# Patient Record
Sex: FEMALE | Race: WHITE | NOT HISPANIC OR LATINO | Employment: FULL TIME | URBAN - NONMETROPOLITAN AREA
[De-identification: names, ages, dates, MRNs, and addresses within clinical notes are randomized per-mention and may not be internally consistent; named-entity substitution may affect disease eponyms.]

---

## 2018-10-22 PROBLEM — O34.211 MATERNAL CARE DUE TO LOW TRANSVERSE UTERINE SCAR FROM PREVIOUS CESAREAN DELIVERY: Status: ACTIVE | Noted: 2018-10-22

## 2018-10-24 PROBLEM — O34.211 MATERNAL CARE DUE TO LOW TRANSVERSE UTERINE SCAR FROM PREVIOUS CESAREAN DELIVERY: Status: RESOLVED | Noted: 2018-10-22 | Resolved: 2018-10-24

## 2021-05-10 DIAGNOSIS — R10.13 EPIGASTRIC PAIN: Primary | ICD-10-CM

## 2021-05-14 ENCOUNTER — HOSPITAL ENCOUNTER (OUTPATIENT)
Dept: RADIOLOGY | Facility: HOSPITAL | Age: 41
Discharge: HOME | End: 2021-05-14
Attending: FAMILY MEDICINE
Payer: COMMERCIAL

## 2021-05-14 DIAGNOSIS — R10.13 EPIGASTRIC PAIN: ICD-10-CM

## 2021-05-14 PROCEDURE — 74240 X-RAY XM UPR GI TRC 1CNTRST: CPT | Mod: TC

## 2021-05-14 PROCEDURE — 25500020 PHARM REV CODE 255: Performed by: FAMILY MEDICINE

## 2021-05-14 PROCEDURE — A9698 NON-RAD CONTRAST MATERIALNOC: HCPCS | Performed by: FAMILY MEDICINE

## 2021-05-14 RX ADMIN — BARIUM SULFATE 135 ML: 980 POWDER, FOR SUSPENSION ORAL at 08:05

## 2021-05-14 RX ADMIN — BARIUM SULFATE 176 G: 960 POWDER, FOR SUSPENSION ORAL at 08:05

## 2024-07-10 ENCOUNTER — OFFICE VISIT (OUTPATIENT)
Dept: PRIMARY CARE CLINIC | Facility: CLINIC | Age: 44
End: 2024-07-10
Payer: COMMERCIAL

## 2024-07-10 VITALS
HEIGHT: 69 IN | WEIGHT: 189 LBS | RESPIRATION RATE: 16 BRPM | OXYGEN SATURATION: 98 % | SYSTOLIC BLOOD PRESSURE: 123 MMHG | HEART RATE: 83 BPM | BODY MASS INDEX: 27.99 KG/M2 | DIASTOLIC BLOOD PRESSURE: 69 MMHG

## 2024-07-10 DIAGNOSIS — Z86.69 HX OF MIGRAINES: ICD-10-CM

## 2024-07-10 DIAGNOSIS — Z51.89 ENCOUNTER FOR BLOOD TRANSFUSION: ICD-10-CM

## 2024-07-10 DIAGNOSIS — Z76.89 ENCOUNTER TO ESTABLISH CARE: Primary | ICD-10-CM

## 2024-07-10 DIAGNOSIS — Z12.31 BREAST CANCER SCREENING BY MAMMOGRAM: ICD-10-CM

## 2024-07-10 PROCEDURE — 99203 OFFICE O/P NEW LOW 30 MIN: CPT | Mod: S$GLB,,, | Performed by: NURSE PRACTITIONER

## 2024-07-10 PROCEDURE — 3008F BODY MASS INDEX DOCD: CPT | Mod: CPTII,S$GLB,, | Performed by: NURSE PRACTITIONER

## 2024-07-10 PROCEDURE — 3078F DIAST BP <80 MM HG: CPT | Mod: CPTII,S$GLB,, | Performed by: NURSE PRACTITIONER

## 2024-07-10 PROCEDURE — 1159F MED LIST DOCD IN RCRD: CPT | Mod: CPTII,S$GLB,, | Performed by: NURSE PRACTITIONER

## 2024-07-10 PROCEDURE — 3074F SYST BP LT 130 MM HG: CPT | Mod: CPTII,S$GLB,, | Performed by: NURSE PRACTITIONER

## 2024-07-10 PROCEDURE — 1160F RVW MEDS BY RX/DR IN RCRD: CPT | Mod: CPTII,S$GLB,, | Performed by: NURSE PRACTITIONER

## 2024-07-10 PROCEDURE — 99999 PR PBB SHADOW E&M-NEW PATIENT-LVL III: CPT | Mod: PBBFAC,,, | Performed by: NURSE PRACTITIONER

## 2024-07-10 NOTE — PROGRESS NOTES
"[facility] Note    HPI:  Delfina Carrero is a 43 y.o. female who presents today for [facility] Care (Est care)      Review of Systems   Constitutional: Negative.    HENT: Negative.     Eyes: Negative.    Respiratory: Negative.     Cardiovascular: Negative.    Gastrointestinal: Negative.    Genitourinary: Negative.    Musculoskeletal:  Positive for back pain (low).   Skin: Negative.    Neurological: Negative.    Endo/Heme/Allergies: Negative.    Psychiatric/Behavioral: Negative.        A review of systems was performed and was negative except as noted above.    I personally reviewed allergies, past medical, surgical, social and family history and updated as appropriate.    Medications:  No current outpatient medications on file.     Health Maintenance:  There is no immunization history for the selected administration types on file for this patient.   Health Maintenance   Topic Date Due    Mammogram  Never done    TETANUS VACCINE  06/06/2032    Hepatitis C Screening  Completed    Lipid Panel  Completed     Health Maintenance Topics with due status: Not Due       Topic Last Completion Date    Influenza Vaccine 12/08/2010    TETANUS VACCINE 06/06/2022    Cervical Cancer Screening 12/06/2023     Health Maintenance Due   Topic Date Due    HIV Screening  Never done    Mammogram  Never done    Hemoglobin A1c (Diabetic Prevention Screening)  Never done    COVID-19 Vaccine (1 - 2023-24 season) Never done       PHYSICAL EXAM:  Vitals:    07/10/24 1038   BP: 123/69   BP Location: Left arm   Patient Position: Sitting   BP Method: Large (Automatic)   Pulse: 83   Resp: 16   SpO2: 98%   Weight: 85.7 kg (189 lb)   Height: 5' 9" (1.753 m)     Body mass index is 27.91 kg/m².  Physical Exam  Constitutional:       Appearance: Normal appearance. She is normal weight.   HENT:      Head: Normocephalic.      Right Ear: Tympanic membrane, ear canal and external ear normal.      Left Ear: Tympanic membrane, ear canal and " external ear normal.      Nose: Nose normal.      Mouth/Throat:      Mouth: Mucous membranes are moist.   Cardiovascular:      Rate and Rhythm: Normal rate and regular rhythm.      Pulses: Normal pulses.      Heart sounds: Normal heart sounds.   Pulmonary:      Effort: Pulmonary effort is normal.      Breath sounds: Normal breath sounds.   Musculoskeletal:         General: Normal range of motion.      Cervical back: Normal range of motion.   Skin:     General: Skin is warm and dry.   Neurological:      General: No focal deficit present.      Mental Status: She is alert and oriented to person, place, and time.          ASSESSMENT/PLAN:  1. Encounter to establish care    2. Breast cancer screening by mammogram  -     Mammo Digital Screening Bilat w/ Bong; Future; Expected date: 07/10/2024    3. Hx of migraines  Overview:  after epidural      4. Encounter for blood transfusion        Other than changes above, continue current medications and maintain follow up with specialists.      No follow-ups on file.   No results found for this or any previous visit (from the past 2016 hour(s)).      Elizabeth Jacmkan, TRISTIAN  [facility]

## 2024-07-12 ENCOUNTER — HOSPITAL ENCOUNTER (OUTPATIENT)
Dept: RADIOLOGY | Facility: HOSPITAL | Age: 44
Discharge: HOME | End: 2024-07-12
Attending: NURSE PRACTITIONER
Payer: COMMERCIAL

## 2024-07-12 VITALS — BODY MASS INDEX: 27.99 KG/M2 | HEIGHT: 69 IN | WEIGHT: 189 LBS

## 2024-07-12 DIAGNOSIS — Z12.31 BREAST CANCER SCREENING BY MAMMOGRAM: ICD-10-CM

## 2024-07-12 PROCEDURE — 77067 SCR MAMMO BI INCL CAD: CPT | Mod: TC

## 2024-07-12 PROCEDURE — 77063 BREAST TOMOSYNTHESIS BI: CPT | Mod: TC

## 2024-12-24 ENCOUNTER — HOSPITAL ENCOUNTER (EMERGENCY)
Facility: HOSPITAL | Age: 44
Discharge: HOME | End: 2024-12-24
Attending: EMERGENCY MEDICINE
Payer: COMMERCIAL

## 2024-12-24 VITALS
BODY MASS INDEX: 29.03 KG/M2 | HEIGHT: 69 IN | TEMPERATURE: 100 F | DIASTOLIC BLOOD PRESSURE: 75 MMHG | SYSTOLIC BLOOD PRESSURE: 121 MMHG | RESPIRATION RATE: 20 BRPM | WEIGHT: 196 LBS | OXYGEN SATURATION: 96 % | HEART RATE: 108 BPM

## 2024-12-24 DIAGNOSIS — B34.9 VIRAL SYNDROME: Primary | ICD-10-CM

## 2024-12-24 DIAGNOSIS — M79.606 LEG PAIN: ICD-10-CM

## 2024-12-24 LAB
ADENOVIRUS: NOT DETECTED
ALBUMIN SERPL BCP-MCNC: 3.5 G/DL (ref 3.5–5.2)
ALP SERPL-CCNC: 71 U/L (ref 55–135)
ALT SERPL W/O P-5'-P-CCNC: 17 U/L (ref 10–44)
ANION GAP SERPL CALC-SCNC: 5 MMOL/L (ref 3–11)
AST SERPL-CCNC: 12 U/L (ref 10–40)
BASOPHILS # BLD AUTO: 0.01 K/UL (ref 0–0.2)
BASOPHILS NFR BLD: 0.3 % (ref 0–1.9)
BILIRUB SERPL-MCNC: 0.5 MG/DL (ref 0.1–1)
BORDETELLA PARAPERTUSSIS (IS1001): NOT DETECTED
BORDETELLA PERTUSSIS (PTXP): NOT DETECTED
BUN SERPL-MCNC: 8 MG/DL (ref 6–20)
CALCIUM SERPL-MCNC: 8.6 MG/DL (ref 8.7–10.5)
CHLAMYDIA PNEUMONIAE: NOT DETECTED
CHLORIDE SERPL-SCNC: 103 MMOL/L (ref 95–110)
CO2 SERPL-SCNC: 28 MMOL/L (ref 23–29)
CORONAVIRUS 229E, COMMON COLD VIRUS: NOT DETECTED
CORONAVIRUS HKU1, COMMON COLD VIRUS: NOT DETECTED
CORONAVIRUS NL63, COMMON COLD VIRUS: NOT DETECTED
CORONAVIRUS OC43, COMMON COLD VIRUS: NOT DETECTED
CREAT SERPL-MCNC: 0.8 MG/DL (ref 0.5–1.4)
D DIMER PPP IA.FEU-MCNC: 0.85 MG/L FEU
DIFFERENTIAL METHOD BLD: ABNORMAL
EOSINOPHIL # BLD AUTO: 0 K/UL (ref 0–0.5)
EOSINOPHIL NFR BLD: 0.5 % (ref 0–8)
ERYTHROCYTE [DISTWIDTH] IN BLOOD BY AUTOMATED COUNT: 12.6 % (ref 11.5–14.5)
EST. GFR  (NO RACE VARIABLE): >60 ML/MIN/1.73 M^2
FLUBV RNA NPH QL NAA+NON-PROBE: NOT DETECTED
GLUCOSE SERPL-MCNC: 128 MG/DL (ref 70–110)
HCT VFR BLD AUTO: 38.1 % (ref 37–48.5)
HGB BLD-MCNC: 12.7 G/DL (ref 12–16)
HPIV1 RNA NPH QL NAA+NON-PROBE: NOT DETECTED
HPIV2 RNA NPH QL NAA+NON-PROBE: NOT DETECTED
HPIV3 RNA NPH QL NAA+NON-PROBE: NOT DETECTED
HPIV4 RNA NPH QL NAA+NON-PROBE: NOT DETECTED
HUMAN METAPNEUMOVIRUS: NOT DETECTED
IMM GRANULOCYTES # BLD AUTO: 0.01 K/UL (ref 0–0.04)
IMM GRANULOCYTES NFR BLD AUTO: 0.3 % (ref 0–0.5)
INFLUENZA A (SUBTYPES H1,H1-2009,H3): NOT DETECTED
LYMPHOCYTES # BLD AUTO: 0.1 K/UL (ref 1–4.8)
LYMPHOCYTES NFR BLD: 3.2 % (ref 18–48)
MCH RBC QN AUTO: 28.4 PG (ref 27–31)
MCHC RBC AUTO-ENTMCNC: 33.3 G/DL (ref 32–36)
MCV RBC AUTO: 85 FL (ref 82–98)
MONOCYTES # BLD AUTO: 0.2 K/UL (ref 0.3–1)
MONOCYTES NFR BLD: 6.1 % (ref 4–15)
MYCOPLASMA PNEUMONIAE: NOT DETECTED
NEUTROPHILS # BLD AUTO: 3.4 K/UL (ref 1.8–7.7)
NEUTROPHILS NFR BLD: 89.6 % (ref 38–73)
NRBC BLD-RTO: 0 /100 WBC
PLATELET # BLD AUTO: 243 K/UL (ref 150–450)
PMV BLD AUTO: 8.8 FL (ref 9.2–12.9)
POTASSIUM SERPL-SCNC: 3.7 MMOL/L (ref 3.5–5.1)
PROT SERPL-MCNC: 6.7 G/DL (ref 6–8.4)
RBC # BLD AUTO: 4.47 M/UL (ref 4–5.4)
RESPIRATORY INFECTION PANEL SOURCE: NORMAL
RSV RNA NPH QL NAA+NON-PROBE: NOT DETECTED
RV+EV RNA NPH QL NAA+NON-PROBE: NOT DETECTED
SARS-COV-2 RNA RESP QL NAA+PROBE: NOT DETECTED
SODIUM SERPL-SCNC: 136 MMOL/L (ref 136–145)
WBC # BLD AUTO: 3.78 K/UL (ref 3.9–12.7)

## 2024-12-24 PROCEDURE — 96372 THER/PROPH/DIAG INJ SC/IM: CPT | Performed by: EMERGENCY MEDICINE

## 2024-12-24 PROCEDURE — 25000003 PHARM REV CODE 250: Performed by: EMERGENCY MEDICINE

## 2024-12-24 PROCEDURE — 80053 COMPREHEN METABOLIC PANEL: CPT | Performed by: EMERGENCY MEDICINE

## 2024-12-24 PROCEDURE — 99284 EMERGENCY DEPT VISIT MOD MDM: CPT | Mod: 25

## 2024-12-24 PROCEDURE — 85025 COMPLETE CBC W/AUTO DIFF WBC: CPT | Performed by: EMERGENCY MEDICINE

## 2024-12-24 PROCEDURE — 87581 M.PNEUMON DNA AMP PROBE: CPT | Performed by: EMERGENCY MEDICINE

## 2024-12-24 PROCEDURE — 63600175 PHARM REV CODE 636 W HCPCS: Performed by: EMERGENCY MEDICINE

## 2024-12-24 PROCEDURE — 36415 COLL VENOUS BLD VENIPUNCTURE: CPT | Performed by: EMERGENCY MEDICINE

## 2024-12-24 PROCEDURE — 85379 FIBRIN DEGRADATION QUANT: CPT | Performed by: EMERGENCY MEDICINE

## 2024-12-24 RX ORDER — MECLIZINE HYDROCHLORIDE 25 MG/1
25 TABLET ORAL 3 TIMES DAILY PRN
Qty: 20 TABLET | Refills: 0 | Status: SHIPPED | OUTPATIENT
Start: 2024-12-24

## 2024-12-24 RX ORDER — ONDANSETRON 4 MG/1
8 TABLET, ORALLY DISINTEGRATING ORAL
Status: COMPLETED | OUTPATIENT
Start: 2024-12-24 | End: 2024-12-24

## 2024-12-24 RX ORDER — ONDANSETRON 4 MG/1
4 TABLET, ORALLY DISINTEGRATING ORAL EVERY 8 HOURS PRN
Qty: 8 TABLET | Refills: 0 | Status: SHIPPED | OUTPATIENT
Start: 2024-12-24

## 2024-12-24 RX ORDER — METHYLPREDNISOLONE ACETATE 80 MG/ML
120 INJECTION, SUSPENSION INTRA-ARTICULAR; INTRALESIONAL; INTRAMUSCULAR; SOFT TISSUE
Status: COMPLETED | OUTPATIENT
Start: 2024-12-24 | End: 2024-12-24

## 2024-12-24 RX ADMIN — ONDANSETRON 8 MG: 4 TABLET, ORALLY DISINTEGRATING ORAL at 05:12

## 2024-12-24 RX ADMIN — METHYLPREDNISOLONE ACETATE 120 MG: 80 INJECTION, SUSPENSION INTRA-ARTICULAR; INTRALESIONAL; INTRAMUSCULAR; SOFT TISSUE at 05:12

## 2024-12-24 NOTE — ED PROVIDER NOTES
Encounter Date: 2024       History     Chief Complaint   Patient presents with    Dizziness     Pt reports dizziness and LLE pain that started this morning. Pt reports she has been fighting a viral illness for the pass 3 wks.      44-year-old female with a history of anemia presents to the ER with dizziness.  She has been fighting a viral infection for over 3 weeks, woke up this morning was extremely dizzy, room was spinning, feels better when she is lying down.  She is concerned because she has a pain behind her left thigh, was told by family member it could be a blood clot.  States occasionally that leg does swell.  History of blood clots in the family.  Denies chest pain or shortness of breath.  Vital signs are stable.  Afebrile.  Alert and oriented x4, GCS is 15        Review of patient's allergies indicates:  No Known Allergies  Past Medical History:   Diagnosis Date    Encounter for blood transfusion     Hx of migraines     after epidural    Neutropenia      Past Surgical History:   Procedure Laterality Date     SECTION       SECTION WITH TUBAL LIGATION Bilateral 10/22/2018    Procedure:  SECTION, WITH TUBAL LIGATION;  Surgeon: Aylin Rod MD;  Location: [facility];  Service: OB/GYN;  Laterality: Bilateral;    CHOLECYSTECTOMY  2019     Family History   Problem Relation Name Age of Onset    Hypertension Mother      Hyperlipidemia Mother      Transient ischemic attack Father      Hypertension Father      No Known Problems Sister      No Known Problems Daughter      No Known Problems Maternal Aunt      No Known Problems Maternal Uncle      No Known Problems Paternal Aunt      No Known Problems Paternal Uncle      No Known Problems Maternal Grandmother      No Known Problems Maternal Grandfather      No Known Problems Paternal Grandmother      No Known Problems Paternal Grandfather      No Known Problems Other      Breast cancer Neg Hx      Ovarian cancer Neg Hx      BRCA 1/2  Neg Hx       Social History     Tobacco Use    Smoking status: Former     Passive exposure: Never    Smokeless tobacco: Never   Substance Use Topics    Alcohol use: No    Drug use: No     Review of Systems   Constitutional:  Negative for fever.   HENT:  Positive for congestion. Negative for sore throat.    Respiratory:  Negative for shortness of breath.    Cardiovascular:  Negative for chest pain.   Gastrointestinal:  Negative for nausea.   Genitourinary:  Negative for dysuria.   Musculoskeletal:  Negative for back pain.   Skin:  Negative for rash.   Neurological:  Negative for weakness.   Hematological:  Does not bruise/bleed easily.   All other systems reviewed and are negative.      Physical Exam     Initial Vitals   BP Pulse Resp Temp SpO2   12/24/24 1708 12/24/24 1708 12/24/24 1708 12/24/24 1710 12/24/24 1708   (!) 156/90 108 18 99 °F (37.2 °C) 97 %      MAP       --                Physical Exam    Nursing note and vitals reviewed.  Constitutional: She appears well-developed and well-nourished. She is not diaphoretic. No distress.   HENT:   Head: Normocephalic and atraumatic.   Eyes: Conjunctivae and EOM are normal. Pupils are equal, round, and reactive to light.   Neck: Neck supple.   Normal range of motion.  Cardiovascular:  Normal rate, regular rhythm, normal heart sounds and intact distal pulses.           No murmur heard.  Pulmonary/Chest: Breath sounds normal. No respiratory distress. She has no wheezes. She has no rhonchi. She has no rales. She exhibits no tenderness.   Abdominal: Abdomen is soft. Bowel sounds are normal.   Musculoskeletal:         General: Tenderness present. No edema. Normal range of motion.      Cervical back: Normal range of motion and neck supple.      Comments: Left leg with tenderness to posterior thigh, feels muscular in nature.  Neurovascularly intact throughout, no swelling or edema noted.  Strong distal pulses.  No color change     Neurological: She is alert and oriented to  person, place, and time. She has normal strength. No cranial nerve deficit. GCS score is 15. GCS eye subscore is 4. GCS verbal subscore is 5. GCS motor subscore is 6.   Skin: Skin is warm and dry. Capillary refill takes less than 2 seconds.         ED Course   Procedures  Labs Reviewed   CBC W/ AUTO DIFFERENTIAL - Abnormal       Result Value    WBC 3.78 (*)     RBC 4.47      Hemoglobin 12.7      Hematocrit 38.1      MCV 85      MCH 28.4      MCHC 33.3      RDW 12.6      Platelets 243      MPV 8.8 (*)     Immature Granulocytes 0.3      Gran # (ANC) 3.4      Immature Grans (Abs) 0.01      Lymph # 0.1 (*)     Mono # 0.2 (*)     Eos # 0.0      Baso # 0.01      nRBC 0      Gran % 89.6 (*)     Lymph % 3.2 (*)     Mono % 6.1      Eosinophil % 0.5      Basophil % 0.3      Differential Method Automated     COMPREHENSIVE METABOLIC PANEL - Abnormal    Sodium 136      Potassium 3.7      Chloride 103      CO2 28      Glucose 128 (*)     BUN 8      Creatinine 0.8      Calcium 8.6 (*)     Total Protein 6.7      Albumin 3.5      Total Bilirubin 0.5      Alkaline Phosphatase 71      AST 12      ALT 17      eGFR >60.0      Anion Gap 5     D DIMER, QUANTITATIVE - Abnormal    D-Dimer 0.85 (*)    RESPIRATORY INFECTION PANEL (PCR), NASOPHARYNGEAL    Respiratory Infection Panel Source NP swab      Adenovirus Not Detected      Coronavirus 229E, Common Cold Virus Not Detected      Coronavirus HKU1, Common Cold Virus Not Detected      Coronavirus NL63, Common Cold Virus Not Detected      Coronavirus OC43, Common Cold Virus Not Detected      SARS-CoV2 (COVID-19) Qualitative PCR Not Detected      Human Metapneumovirus Not Detected      Human Rhinovirus/Enterovirus Not Detected      Influenza A (subtypes H1, H1-2009,H3) Not Detected      Influenza B Not Detected      Parainfluenza Virus 1 Not Detected      Parainfluenza Virus 2 Not Detected      Parainfluenza Virus 3 Not Detected      Parainfluenza Virus 4 Not Detected      Respiratory  Syncytial Virus Not Detected      Bordetella Parapertussis (NP7095) Not Detected      Bordetella pertussis (ptxP) Not Detected      Chlamydia pneumoniae Not Detected      Mycoplasma pneumoniae Not Detected      Narrative:     Assay not valid for lower respiratory specimens, alternate  testing required.  Respiratory Infection Panel source->NP Swab          Imaging Results              US Lower Extremity Veins Left (Final result)  Result time 12/24/24 19:32:05      Final result by Adrián Pinto MD (12/24/24 19:32:05)                   Impression:      No sonographic evidence for deep venous thrombosis within imaged portions of the left lower extremity veins.      Electronically signed by: Adrián Pinto MD  Date:    12/24/2024  Time:    19:32               Narrative:    EXAMINATION:  US LOWER EXTREMITY VEINS LEFT    CLINICAL HISTORY:  Pain in leg, unspecified    TECHNIQUE:  Duplex examination performed left lower extremity veins.    COMPARISON:  No priors available    FINDINGS:  Normal sonographic appearance of imaged portions of the left common femoral, superficial femoral, greater saphenous, popliteal, posterior tibial veins.                                       Medications   methylPREDNISolone acetate injection 120 mg (120 mg Intramuscular Given 12/24/24 1730)   ondansetron disintegrating tablet 8 mg (8 mg Oral Given 12/24/24 1758)     Medical Decision Making  Amount and/or Complexity of Data Reviewed  Labs: ordered.    Risk  Prescription drug management.                          Medical Decision Making:   Differential Diagnosis:   Viral illness, musculoskeletal pain, DVT         8:49 PM  Assumed care at 1800  Patient presents with dizziness described as vertigo this morning.  Worse when turning to the left.  Symptoms improved at time of my evaluation.  She reports past 2 weeks has been doing on with URI like symptoms.  Reports the past couple days having thick nasal discharge and sinus pressure.  Patient also  complains of musculoskeletal pain.  Had some neck pain a few weeks ago.  Had some left leg pain today.  Expressed concern about DVT and D-dimer was mildly elevated so she had ultrasound study that was negative for DVT.  CBC and chemistry are grossly unremarkable.  Viral panel was negative.  She has no focal neurologic deficit or other features concerning for a central process.  She is afebrile. Likely BPPV with sinusitis.  Discussed this with patient.  Discussed typical course and symptom management.  Instructed to return to ED if symptoms worsen or change in nature.  I have requested she have close follow-up with her PCP due to multiple complaints and duration of symptoms.  Patient expressed understanding and agreement with plan.    Roberto Carlos Danielson          Clinical Impression:  Final diagnoses:  [M79.606] Leg pain  [B34.9] Viral syndrome (Primary)          ED Disposition Condition    Discharge Stable          ED Prescriptions       Medication Sig Dispense Start Date End Date Auth. Provider    meclizine (ANTIVERT) 25 mg tablet Take 1 tablet (25 mg total) by mouth 3 (three) times daily as needed for Dizziness. 20 tablet 12/24/2024 -- Roberto Carlos Danielson MD    ondansetron (ZOFRAN-ODT) 4 MG TbDL Take 1 tablet (4 mg total) by mouth every 8 (eight) hours as needed (Nasuea). 8 tablet 12/24/2024 -- Roberto Carlos Danielson MD          Follow-up Information       Follow up With Specialties Details Why Contact Info Additional Information    Elizabeth Jackman, NP Family Medicine Schedule an appointment as soon as possible for a visit  Primary care follow up [address]  [address] [address]  [phone]       [facility] Emergency Department Emergency Medicine Go to  If symptoms worsen [address] [facility] [id number] [phone] Floor [address]             Roberto Carlos Danielson MD  12/24/24 5826

## 2024-12-26 ENCOUNTER — OFFICE VISIT (OUTPATIENT)
Dept: PRIMARY CARE CLINIC | Facility: CLINIC | Age: 44
End: 2024-12-26
Payer: COMMERCIAL

## 2024-12-26 VITALS
OXYGEN SATURATION: 96 % | WEIGHT: 191.69 LBS | RESPIRATION RATE: 16 BRPM | HEART RATE: 93 BPM | TEMPERATURE: 99 F | SYSTOLIC BLOOD PRESSURE: 119 MMHG | BODY MASS INDEX: 28.39 KG/M2 | DIASTOLIC BLOOD PRESSURE: 70 MMHG | HEIGHT: 69 IN

## 2024-12-26 DIAGNOSIS — M79.89 PAIN AND SWELLING OF LEFT LOWER EXTREMITY: Primary | ICD-10-CM

## 2024-12-26 DIAGNOSIS — M79.605 PAIN AND SWELLING OF LEFT LOWER EXTREMITY: Primary | ICD-10-CM

## 2024-12-26 PROCEDURE — 99999 PR PBB SHADOW E&M-EST. PATIENT-LVL III: CPT | Mod: PBBFAC,,, | Performed by: NURSE PRACTITIONER

## 2024-12-26 PROCEDURE — 3074F SYST BP LT 130 MM HG: CPT | Mod: CPTII,S$GLB,, | Performed by: NURSE PRACTITIONER

## 2024-12-26 PROCEDURE — 3008F BODY MASS INDEX DOCD: CPT | Mod: CPTII,S$GLB,, | Performed by: NURSE PRACTITIONER

## 2024-12-26 PROCEDURE — 99214 OFFICE O/P EST MOD 30 MIN: CPT | Mod: S$GLB,,, | Performed by: NURSE PRACTITIONER

## 2024-12-26 PROCEDURE — 3078F DIAST BP <80 MM HG: CPT | Mod: CPTII,S$GLB,, | Performed by: NURSE PRACTITIONER

## 2024-12-26 PROCEDURE — 1159F MED LIST DOCD IN RCRD: CPT | Mod: CPTII,S$GLB,, | Performed by: NURSE PRACTITIONER

## 2024-12-26 PROCEDURE — 1160F RVW MEDS BY RX/DR IN RCRD: CPT | Mod: CPTII,S$GLB,, | Performed by: NURSE PRACTITIONER

## 2024-12-26 NOTE — PROGRESS NOTES
[facility] Note    HPI:  Delfina Carrero is a 44 y.o. female who presents today for Leg Pain, Dizziness, and Nasal Congestion (Pt here for leg pain,dizziness, congestion)           Review of Systems   HENT:  Negative for hearing loss.    Eyes:  Negative for discharge.   Respiratory:  Negative for wheezing.    Cardiovascular:  Positive for palpitations and leg swelling. Negative for chest pain.   Gastrointestinal:  Positive for diarrhea. Negative for blood in stool, constipation and vomiting.   Genitourinary:  Negative for dysuria and hematuria.   Musculoskeletal:  Positive for neck pain.   Neurological:  Positive for weakness and headaches.   Endo/Heme/Allergies:  Negative for polydipsia.      A review of systems was performed and was negative except as noted above.    I personally reviewed allergies, past medical, surgical, social and family history and updated as appropriate.    Medications:    Current Outpatient Medications:     meclizine (ANTIVERT) 25 mg tablet, Take 1 tablet (25 mg total) by mouth 3 (three) times daily as needed for Dizziness., Disp: 20 tablet, Rfl: 0    ondansetron (ZOFRAN-ODT) 4 MG TbDL, Take 1 tablet (4 mg total) by mouth every 8 (eight) hours as needed (Nasuea)., Disp: 8 tablet, Rfl: 0     Health Maintenance:  Immunization History   Administered Date(s) Administered    Influenza - Trivalent - Afluria, Fluzone MDV 12/08/2010    Tdap 12/01/2008, 12/08/2010, 06/06/2022      Health Maintenance   Topic Date Due    HIV Screening  Never done    COVID-19 Vaccine (1 - 2024-25 season) 12/26/2025 (Originally 9/1/2024)    Mammogram  07/12/2025    Cervical Cancer Screening  12/06/2026    Hemoglobin A1c (Diabetic Prevention Screening)  12/06/2026    TETANUS VACCINE  06/06/2032    RSV Vaccine (Age 60+ and Pregnant patients) (1 - 1-dose 75+ series) 08/02/2057    Hepatitis C Screening  Completed    Lipid Panel  Completed    Pneumococcal Vaccines (Age 0-49)  Aged Out    Influenza Vaccine   "Discontinued     Health Maintenance Topics with due status: Not Due       Topic Last Completion Date    TETANUS VACCINE 06/06/2022    Cervical Cancer Screening 12/06/2023    Hemoglobin A1c (Diabetic Prevention Screening) 12/06/2023    Mammogram 07/12/2024    RSV Vaccine (Age 60+ and Pregnant patients) Not Due     Health Maintenance Due   Topic Date Due    HIV Screening  Never done       PHYSICAL EXAM:  Vitals:    12/26/24 1432   BP: 119/70   BP Location: Left arm   Patient Position: Sitting   Pulse: 93   Resp: 16   Temp: 98.6 °F (37 °C)   TempSrc: Temporal   SpO2: 96%   Weight: 87 kg (191 lb 11.2 oz)   Height: 5' 9" (1.753 m)     Body mass index is 28.31 kg/m².  Physical Exam  Vitals and nursing note reviewed.   Constitutional:       Appearance: Normal appearance.   Cardiovascular:      Rate and Rhythm: Normal rate and regular rhythm.      Pulses: Decreased pulses.      Heart sounds: Normal heart sounds.   Pulmonary:      Effort: Pulmonary effort is normal.      Breath sounds: Normal breath sounds.   Musculoskeletal:         General: Tenderness (left leg) present.   Neurological:      Mental Status: She is alert.          ASSESSMENT/PLAN:  1. Pain and swelling of left lower extremity        Other than changes above, continue current medications and maintain follow up with specialists.      No follow-ups on file.   Recent Results (from the past 12 weeks)   Respiratory Infection Panel (PCR), Nasopharyngeal    Collection Time: 12/24/24  5:32 PM    Specimen: Nasopharyngeal Swab   Result Value Ref Range    Respiratory Infection Panel Source NP swab     Adenovirus Not Detected Not Detected    Coronavirus 229E, Common Cold Virus Not Detected Not Detected    Coronavirus HKU1, Common Cold Virus Not Detected Not Detected    Coronavirus NL63, Common Cold Virus Not Detected Not Detected    Coronavirus OC43, Common Cold Virus Not Detected Not Detected    SARS-CoV2 (COVID-19) Qualitative PCR Not Detected Not Detected    Human " Metapneumovirus Not Detected Not Detected    Human Rhinovirus/Enterovirus Not Detected Not Detected    Influenza A (subtypes H1, H1-2009,H3) Not Detected Not Detected    Influenza B Not Detected Not Detected    Parainfluenza Virus 1 Not Detected Not Detected    Parainfluenza Virus 2 Not Detected Not Detected    Parainfluenza Virus 3 Not Detected Not Detected    Parainfluenza Virus 4 Not Detected Not Detected    Respiratory Syncytial Virus Not Detected Not Detected    Bordetella Parapertussis (QJ4087) Not Detected Not Detected    Bordetella pertussis (ptxP) Not Detected Not Detected    Chlamydia pneumoniae Not Detected Not Detected    Mycoplasma pneumoniae Not Detected Not Detected   CBC auto differential    Collection Time: 12/24/24  5:56 PM   Result Value Ref Range    WBC 3.78 (L) 3.90 - 12.70 K/uL    RBC 4.47 4.00 - 5.40 M/uL    Hemoglobin 12.7 12.0 - 16.0 g/dL    Hematocrit 38.1 37.0 - 48.5 %    MCV 85 82 - 98 fL    MCH 28.4 27.0 - 31.0 pg    MCHC 33.3 32.0 - 36.0 g/dL    RDW 12.6 11.5 - 14.5 %    Platelets 243 150 - 450 K/uL    MPV 8.8 (L) 9.2 - 12.9 fL    Immature Granulocytes 0.3 0.0 - 0.5 %    Gran # (ANC) 3.4 1.8 - 7.7 K/uL    Immature Grans (Abs) 0.01 0.00 - 0.04 K/uL    Lymph # 0.1 (L) 1.0 - 4.8 K/uL    Mono # 0.2 (L) 0.3 - 1.0 K/uL    Eos # 0.0 0.0 - 0.5 K/uL    Baso # 0.01 0.00 - 0.20 K/uL    nRBC 0 0 /100 WBC    Gran % 89.6 (H) 38.0 - 73.0 %    Lymph % 3.2 (L) 18.0 - 48.0 %    Mono % 6.1 4.0 - 15.0 %    Eosinophil % 0.5 0.0 - 8.0 %    Basophil % 0.3 0.0 - 1.9 %    Differential Method Automated    Comprehensive metabolic panel    Collection Time: 12/24/24  5:56 PM   Result Value Ref Range    Sodium 136 136 - 145 mmol/L    Potassium 3.7 3.5 - 5.1 mmol/L    Chloride 103 95 - 110 mmol/L    CO2 28 23 - 29 mmol/L    Glucose 128 (H) 70 - 110 mg/dL    BUN 8 6 - 20 mg/dL    Creatinine 0.8 0.5 - 1.4 mg/dL    Calcium 8.6 (L) 8.7 - 10.5 mg/dL    Total Protein 6.7 6.0 - 8.4 g/dL    Albumin 3.5 3.5 - 5.2 g/dL     Total Bilirubin 0.5 0.1 - 1.0 mg/dL    Alkaline Phosphatase 71 55 - 135 U/L    AST 12 10 - 40 U/L    ALT 17 10 - 44 U/L    eGFR >60.0 >60 mL/min/1.73 m^2    Anion Gap 5 3 - 11 mmol/L   D dimer, quantitative    Collection Time: 12/24/24  5:56 PM   Result Value Ref Range    D-Dimer 0.85 (H) <0.50 mg/L MARY Jackman, KATIP  [facility]                  Answers submitted by the patient for this visit:  Review of Systems Questionnaire (Submitted on 12/26/2024)  activity change: No  unexpected weight change: No  rhinorrhea: Yes  trouble swallowing: No  visual disturbance: No  chest tightness: No  polyuria: No  difficulty urinating: No  menstrual problem: Yes  joint swelling: Yes  arthralgias: Yes  confusion: No  dysphoric mood: No

## 2025-06-16 ENCOUNTER — TELEPHONE (OUTPATIENT)
Dept: PRIMARY CARE CLINIC | Facility: CLINIC | Age: 45
End: 2025-06-16
Payer: COMMERCIAL

## 2025-06-18 DIAGNOSIS — S89.91XA INJURY OF RIGHT KNEE, INITIAL ENCOUNTER: Primary | ICD-10-CM

## 2025-06-23 ENCOUNTER — HOSPITAL ENCOUNTER (OUTPATIENT)
Dept: RADIOLOGY | Facility: HOSPITAL | Age: 45
Discharge: HOME | End: 2025-06-23
Attending: NURSE PRACTITIONER
Payer: COMMERCIAL

## 2025-06-23 ENCOUNTER — RESULTS FOLLOW-UP (OUTPATIENT)
Dept: PRIMARY CARE CLINIC | Facility: CLINIC | Age: 45
End: 2025-06-23

## 2025-06-23 DIAGNOSIS — S89.91XA INJURY OF RIGHT KNEE, INITIAL ENCOUNTER: ICD-10-CM

## 2025-06-23 PROCEDURE — 73721 MRI JNT OF LWR EXTRE W/O DYE: CPT | Mod: TC,RT

## 2025-07-24 DIAGNOSIS — Z12.31 OTHER SCREENING MAMMOGRAM: ICD-10-CM
